# Patient Record
Sex: FEMALE | Race: BLACK OR AFRICAN AMERICAN
[De-identification: names, ages, dates, MRNs, and addresses within clinical notes are randomized per-mention and may not be internally consistent; named-entity substitution may affect disease eponyms.]

---

## 2015-10-14 VITALS — SYSTOLIC BLOOD PRESSURE: 109 MMHG | DIASTOLIC BLOOD PRESSURE: 62 MMHG

## 2019-01-30 ENCOUNTER — HOSPITAL ENCOUNTER (OUTPATIENT)
Dept: HOSPITAL 61 - MAMMO | Age: 54
Discharge: HOME | End: 2019-01-30
Attending: FAMILY MEDICINE
Payer: COMMERCIAL

## 2019-01-30 DIAGNOSIS — Z12.31: Primary | ICD-10-CM

## 2019-01-30 PROCEDURE — 77067 SCR MAMMO BI INCL CAD: CPT

## 2019-01-31 NOTE — RAD
DATE: 1/30/2019



EXAM: DIGITAL SCREEN BILAT W/CAD    



HISTORY: Screening Mammogram



COMPARISON: Mammogram 4/15/2014



This study was interpreted with the benefit of Computerized Aided Detection

(CAD).



The breast parenchyma is primarily fatty replaced. Breast parenchyma level

density A.



FINDINGS: Bilateral digital 2-D and 3-D tomosynthesis CC and MLO views. No

suspicious mass, calcification or architectural distortion. No significant

change from prior examination    



IMPRESSION: No mammographic evidence of malignancy. Recommend routine

screening mammogram in 12 months. 



BI-RADS CATEGORY: 1 NEGATIVE



RECOMMENDED FOLLOW-UP: 12M 12 MONTH FOLLOW-UP



PQRS compliance statement: Patient information was entered into a reminder

system with a target due date     for the next mammogram.



Mammography is a sensitive method for finding small breast cancers, but it

does not detect them all and is not a substitute for careful clinical

examination.  A negative mammogram does not negate a clinically suspicious

finding and should not result in delay in biopsying a clinically suspicious

abnormality.



"Our facility is accredited by the [employer] Mammography

Program."